# Patient Record
Sex: MALE | Race: WHITE | NOT HISPANIC OR LATINO
[De-identification: names, ages, dates, MRNs, and addresses within clinical notes are randomized per-mention and may not be internally consistent; named-entity substitution may affect disease eponyms.]

---

## 2017-09-20 ENCOUNTER — APPOINTMENT (OUTPATIENT)
Dept: ELECTROPHYSIOLOGY | Facility: CLINIC | Age: 57
End: 2017-09-20
Payer: COMMERCIAL

## 2017-09-20 VITALS
WEIGHT: 274 LBS | OXYGEN SATURATION: 96 % | BODY MASS INDEX: 38.36 KG/M2 | DIASTOLIC BLOOD PRESSURE: 75 MMHG | HEIGHT: 71 IN | HEART RATE: 87 BPM | SYSTOLIC BLOOD PRESSURE: 120 MMHG

## 2017-09-20 PROCEDURE — 99215 OFFICE O/P EST HI 40 MIN: CPT | Mod: 25

## 2017-09-20 PROCEDURE — 93000 ELECTROCARDIOGRAM COMPLETE: CPT

## 2017-09-20 RX ORDER — SELENIUM 50 MCG
TABLET ORAL
Refills: 0 | Status: ACTIVE | COMMUNITY

## 2017-09-20 RX ORDER — UBIDECARENONE 200 MG
CAPSULE ORAL
Refills: 0 | Status: ACTIVE | COMMUNITY

## 2017-09-20 RX ORDER — FLAXSEED
POWDER (GRAM) ORAL
Refills: 0 | Status: ACTIVE | COMMUNITY

## 2017-09-20 RX ORDER — MAGNESIUM OXIDE/MAG AA CHELATE 300 MG
CAPSULE ORAL
Refills: 0 | Status: ACTIVE | COMMUNITY

## 2017-09-20 RX ORDER — B-COMPLEX WITH VITAMIN C
TABLET ORAL
Refills: 0 | Status: ACTIVE | COMMUNITY

## 2017-11-09 ENCOUNTER — APPOINTMENT (OUTPATIENT)
Dept: CV DIAGNOSITCS | Facility: HOSPITAL | Age: 57
End: 2017-11-09

## 2024-09-02 ENCOUNTER — EMERGENCY (EMERGENCY)
Facility: HOSPITAL | Age: 64
LOS: 1 days | Discharge: HOME | End: 2024-09-02
Attending: EMERGENCY MEDICINE | Admitting: STUDENT IN AN ORGANIZED HEALTH CARE EDUCATION/TRAINING PROGRAM
Payer: COMMERCIAL

## 2024-09-02 VITALS
DIASTOLIC BLOOD PRESSURE: 66 MMHG | WEIGHT: 251.11 LBS | RESPIRATION RATE: 18 BRPM | HEART RATE: 99 BPM | SYSTOLIC BLOOD PRESSURE: 125 MMHG | OXYGEN SATURATION: 99 % | TEMPERATURE: 100 F

## 2024-09-02 LAB
ALBUMIN SERPL ELPH-MCNC: 3.7 G/DL — SIGNIFICANT CHANGE UP (ref 3.3–5)
ALP SERPL-CCNC: 118 U/L — SIGNIFICANT CHANGE UP (ref 40–120)
ALT FLD-CCNC: 22 U/L — SIGNIFICANT CHANGE UP (ref 4–41)
ANION GAP SERPL CALC-SCNC: 14 MMOL/L — SIGNIFICANT CHANGE UP (ref 7–14)
AST SERPL-CCNC: 15 U/L — SIGNIFICANT CHANGE UP (ref 4–40)
BASOPHILS # BLD AUTO: 0.04 K/UL — SIGNIFICANT CHANGE UP (ref 0–0.2)
BASOPHILS NFR BLD AUTO: 0.4 % — SIGNIFICANT CHANGE UP (ref 0–2)
BILIRUB SERPL-MCNC: 0.8 MG/DL — SIGNIFICANT CHANGE UP (ref 0.2–1.2)
BUN SERPL-MCNC: 15 MG/DL — SIGNIFICANT CHANGE UP (ref 7–23)
CALCIUM SERPL-MCNC: 9.2 MG/DL — SIGNIFICANT CHANGE UP (ref 8.4–10.5)
CHLORIDE SERPL-SCNC: 99 MMOL/L — SIGNIFICANT CHANGE UP (ref 98–107)
CO2 SERPL-SCNC: 22 MMOL/L — SIGNIFICANT CHANGE UP (ref 22–31)
CREAT SERPL-MCNC: 0.75 MG/DL — SIGNIFICANT CHANGE UP (ref 0.5–1.3)
EGFR: 101 ML/MIN/1.73M2 — SIGNIFICANT CHANGE UP
EOSINOPHIL # BLD AUTO: 0.05 K/UL — SIGNIFICANT CHANGE UP (ref 0–0.5)
EOSINOPHIL NFR BLD AUTO: 0.5 % — SIGNIFICANT CHANGE UP (ref 0–6)
GLUCOSE SERPL-MCNC: 120 MG/DL — HIGH (ref 70–99)
HCT VFR BLD CALC: 46.4 % — SIGNIFICANT CHANGE UP (ref 39–50)
HGB BLD-MCNC: 16.4 G/DL — SIGNIFICANT CHANGE UP (ref 13–17)
IANC: 7.58 K/UL — HIGH (ref 1.8–7.4)
IMM GRANULOCYTES NFR BLD AUTO: 0.2 % — SIGNIFICANT CHANGE UP (ref 0–0.9)
LYMPHOCYTES # BLD AUTO: 18.5 % — SIGNIFICANT CHANGE UP (ref 13–44)
LYMPHOCYTES # BLD AUTO: 2.01 K/UL — SIGNIFICANT CHANGE UP (ref 1–3.3)
MCHC RBC-ENTMCNC: 30.9 PG — SIGNIFICANT CHANGE UP (ref 27–34)
MCHC RBC-ENTMCNC: 35.3 GM/DL — SIGNIFICANT CHANGE UP (ref 32–36)
MCV RBC AUTO: 87.4 FL — SIGNIFICANT CHANGE UP (ref 80–100)
MONOCYTES # BLD AUTO: 1.17 K/UL — HIGH (ref 0–0.9)
MONOCYTES NFR BLD AUTO: 10.8 % — SIGNIFICANT CHANGE UP (ref 2–14)
NEUTROPHILS # BLD AUTO: 7.58 K/UL — HIGH (ref 1.8–7.4)
NEUTROPHILS NFR BLD AUTO: 69.6 % — SIGNIFICANT CHANGE UP (ref 43–77)
NRBC # BLD: 0 /100 WBCS — SIGNIFICANT CHANGE UP (ref 0–0)
NRBC # FLD: 0 K/UL — SIGNIFICANT CHANGE UP (ref 0–0)
PLATELET # BLD AUTO: 147 K/UL — LOW (ref 150–400)
POTASSIUM SERPL-MCNC: 4.1 MMOL/L — SIGNIFICANT CHANGE UP (ref 3.5–5.3)
POTASSIUM SERPL-SCNC: 4.1 MMOL/L — SIGNIFICANT CHANGE UP (ref 3.5–5.3)
PROT SERPL-MCNC: 7.1 G/DL — SIGNIFICANT CHANGE UP (ref 6–8.3)
RBC # BLD: 5.31 M/UL — SIGNIFICANT CHANGE UP (ref 4.2–5.8)
RBC # FLD: 12.9 % — SIGNIFICANT CHANGE UP (ref 10.3–14.5)
SODIUM SERPL-SCNC: 135 MMOL/L — SIGNIFICANT CHANGE UP (ref 135–145)
WBC # BLD: 10.87 K/UL — HIGH (ref 3.8–10.5)
WBC # FLD AUTO: 10.87 K/UL — HIGH (ref 3.8–10.5)

## 2024-09-02 PROCEDURE — 93010 ELECTROCARDIOGRAM REPORT: CPT

## 2024-09-02 RX ORDER — ACETAMINOPHEN 325 MG/1
1000 TABLET ORAL ONCE
Refills: 0 | Status: COMPLETED | OUTPATIENT
Start: 2024-09-02 | End: 2024-09-02

## 2024-09-02 RX ADMIN — Medication 4 MILLIGRAM(S): at 22:29

## 2024-09-02 RX ADMIN — ACETAMINOPHEN 400 MILLIGRAM(S): 325 TABLET ORAL at 22:28

## 2024-09-02 NOTE — ED ADULT NURSE NOTE - CHIEF COMPLAINT QUOTE
Arrives from [facility] for cellulitis of Left side of face. Redness/Slight swelling to left cheek. No distress. As per  EMS was in rapid afib prior to transfer to [facility]. Received Tylenol 650mg and home dose Cardizem prior to leaving hospital, Also received Zosyn, Flagyl, morphine x2 doses , 1L NS infusing. Hx Afib on xarelto, HTN, HLD, T2DM, CAD,  Paper work/ CT CD placed in chart

## 2024-09-02 NOTE — ED PROVIDER NOTE - CLINICAL SUMMARY MEDICAL DECISION MAKING FREE TEXT BOX
NADINE Cooper PGY2- patient transferred from [place] for eval by dental/omfs, 5 days of L upper and now lower dental pain, with 1 day of erythema to face overlying L maxilla; dx with cellulitis at OSH, originating from underlying dental infection per CT scan. Borderline febrile here, will give tylenol as well as morphine for pain, redraw labs, give abx as appropriate. will call dental/omfs to evaluate patient.

## 2024-09-02 NOTE — ED PROVIDER NOTE - PROGRESS NOTE DETAILS
NADINE Cooper PGY2- patient evaluated by OMFS and dental, dental to perform procedure. patient received levaquin and flagyl at outside hospital, plan to put in CDU, continue abx, dental and omfs to re-evaluate in morning

## 2024-09-02 NOTE — ED PROVIDER NOTE - OBJECTIVE STATEMENT
The patient is a 63 y/o M with past medical history of AFIB on xarelto, HTN, HLD, DM, CAD, transferred from [facility] for evaluation by dental/OMFS. Patient with 5 days of worsening dental pain, started in L upper teeth, now also involving left lower teeth. The patient is a 63 y/o M with past medical history of AFIB on xarelto, HTN, HLD, DM, CAD, transferred from [facility] for evaluation by dental/OMFS. Patient with 5 days of dental pain, initially L upper teeth now also involving L lower teeth. In past day noted to have facial erythema overlying L maxilla. Evaluated at [place], febrile there, give tylenol, morphine, as well as doses of levaquin and flagyl for dental infection/cellulitis. CT max/face done there with no abscess, cellulitis appearing to be originating from dental infection. Sent here for eval by dental/omfs.

## 2024-09-02 NOTE — ED PROVIDER NOTE - PHYSICAL EXAMINATION
General: no acute distress  Psych: mood appropriate  Head: normocephalic; erythema and swelling overlying L maxilla and involving L lower eyelid  Eyes: conjunctivae clear bilaterally, sclerae anicteric; eomi, perrl, no pain with extraocular movements  ENT: no nasal flaring, patent nares; significant swelling to L upper gums, very tender, no discrete abscess noted, no drainage from area  Cardio: regular rate and rhythm; normal heart sounds  Resp: clear to auscultation bilaterally  GI: abdomen soft, nontender, nondistended  Neuro: A&Ox3  Skin: erythema to face as noted above  MSK: normal movement of extremities

## 2024-09-02 NOTE — ED ADULT NURSE NOTE - OBJECTIVE STATEMENT
Patient received to room 10, A/Ox4, ambulatory as transfer from [place] for ENT. Patient endorsing left-sided facial swelling and redness starting this AM. Patient found to have cellulitis and transferred. Denies SOB, difficulty swallowing and visual changes. Cheek swollen and red. Arrives with 20G IV to right hand. Placed on cardiac monitor, sinus tach. Safety maintained.

## 2024-09-02 NOTE — ED ADULT TRIAGE NOTE - CHIEF COMPLAINT QUOTE
Arrives from [facility] for cellulitis of Left side of face. Redness/Slight swelling to left cheek.  As pre EMS was in rapid afib prior to transfer to [facility]. Received Tylenol 650mg and home dose Cardizem prior to leaving hospital, Also received Zosyn, Flagyl, morphine x2 doses , 1L NS infusing. Hx Afib on xarelto, HTN, HLD, T2DM, CAD, Arrives from [facility] for cellulitis of Left side of face. Redness/Slight swelling to left cheek. No distress. As per  EMS was in rapid afib prior to transfer to [facility]. Received Tylenol 650mg and home dose Cardizem prior to leaving hospital, Also received Zosyn, Flagyl, morphine x2 doses , 1L NS infusing. Hx Afib on xarelto, HTN, HLD, T2DM, CAD,  Paper work/ CT CD placed in chart

## 2024-09-02 NOTE — ED ADULT TRIAGE NOTE - SOURCE OF INFORMATION
From: Tahira Zimmerman  To: Fito Ram MD  Sent: 1/25/2021 11:01 AM EST  Subject: Prescription Question    May I have an early refill? I have been in a lot more pain with the brain cancer and the cancer that’s spread all through my body. I’m just in a lot of pain.  
Patient

## 2024-09-02 NOTE — ED PROVIDER NOTE - ATTENDING CONTRIBUTION TO CARE
IMPRESSION: Left-sided perimandibular soft tissue swelling tracking to the  left cheek and left inferior periorbital soft tissues compatible with  cellulitis. A suspected odontogenic infection is suspected, however,  metallic dental artifact markedly limits evaluation of the teeth. No  drainable abscess.    Probable left-sided maxillary odontogenic sinusitis Patient is a 64-year-old male with a history of hypertension, hyperlipidemia, atrial fibrillation on Xarelto transferred from [facility] for dental/OMFS secondary to dental infection and left-sided facial cellulitis.  Patient reports that he had pain that started 5-6 days ago on Wednesday.  He reports that this morning he woke up with swelling in his face and developed a fever.  Prior to transfer, patient was in rapid A-fib.  He received his home dose of Cardizem.  Patient received  IV antibiotics prior to transfer.      CT IMPRESSION: Left-sided perimandibular soft tissue swelling tracking to the  left cheek and left inferior periorbital soft tissues compatible with  cellulitis. A suspected odontogenic infection is suspected, however,  metallic dental artifact markedly limits evaluation of the teeth. No  drainable abscess.    Probable left-sided maxillary odontogenic sinusitis     VS noted -  febrile, tachycardic  Gen. no acute distress, Non toxic   HEENT: EOMI, mmm, left facial swelling, TTP  Lungs: CTAB/L no C/ W /R   CVS: RRR   Abd; Soft non tender, non distended   Ext: no edema  Skin: no rash  Neuro AAOx3 non focal clear speech  a/p:  left facial cellulitis–patient is febrile.  Patient was transferred for OMFS/dental.  Plan for labs, pain control, continued antibiotics.  Patient will likely need to be admitted.  Eva Shields MD Patient is a 64-year-old male with a history of hypertension, hyperlipidemia, atrial fibrillation on Xarelto transferred from [facility] for dental/OMFS secondary to dental infection and left-sided facial cellulitis.  Patient reports that he had pain that started 5-6 days ago on Wednesday.  He reports that this morning he woke up with swelling in his face and developed a fever.  Prior to transfer, patient was in rapid A-fib.  He received his home dose of Cardizem.  Patient received  IV antibiotics prior to transfer.      CT IMPRESSION: Left-sided perimandibular soft tissue swelling tracking to the  left cheek and left inferior periorbital soft tissues compatible with  cellulitis. A suspected odontogenic infection is suspected, however,  metallic dental artifact markedly limits evaluation of the teeth. No  drainable abscess.    Probable left-sided maxillary odontogenic sinusitis     VS noted -  febrile, tachycardic  Gen. no acute distress, Non toxic   HEENT: EOMI, mmm, left facial swelling, TTP  Lungs: CTAB/L no C/ W /R   CVS: RRR   Abd; Soft non tender, non distended   Ext: no edema  Skin: no rash  Neuro AAOx3 non focal clear speech  a/p:  left facial cellulitis–patient is febrile.  Patient was transferred for OMFS/dental.  Plan for labs, pain control, continued antibiotics.  Patient will likely need to be admitted vs CDU.  - Alyson ORTA

## 2024-09-03 DIAGNOSIS — I25.10 ATHEROSCLEROTIC HEART DISEASE OF NATIVE CORONARY ARTERY WITHOUT ANGINA PECTORIS: Chronic | ICD-10-CM

## 2024-09-03 LAB
A1C WITH ESTIMATED AVERAGE GLUCOSE RESULT: 7.4 % — HIGH (ref 4–5.6)
ALBUMIN SERPL ELPH-MCNC: 3.6 G/DL — SIGNIFICANT CHANGE UP (ref 3.3–5)
ALP SERPL-CCNC: 101 U/L — SIGNIFICANT CHANGE UP (ref 40–120)
ALT FLD-CCNC: 21 U/L — SIGNIFICANT CHANGE UP (ref 4–41)
ANION GAP SERPL CALC-SCNC: 10 MMOL/L — SIGNIFICANT CHANGE UP (ref 7–14)
AST SERPL-CCNC: 15 U/L — SIGNIFICANT CHANGE UP (ref 4–40)
BASOPHILS # BLD AUTO: 0.03 K/UL — SIGNIFICANT CHANGE UP (ref 0–0.2)
BASOPHILS NFR BLD AUTO: 0.4 % — SIGNIFICANT CHANGE UP (ref 0–2)
BILIRUB SERPL-MCNC: 0.6 MG/DL — SIGNIFICANT CHANGE UP (ref 0.2–1.2)
BUN SERPL-MCNC: 16 MG/DL — SIGNIFICANT CHANGE UP (ref 7–23)
CALCIUM SERPL-MCNC: 9.1 MG/DL — SIGNIFICANT CHANGE UP (ref 8.4–10.5)
CHLORIDE SERPL-SCNC: 102 MMOL/L — SIGNIFICANT CHANGE UP (ref 98–107)
CO2 SERPL-SCNC: 23 MMOL/L — SIGNIFICANT CHANGE UP (ref 22–31)
CREAT SERPL-MCNC: 0.81 MG/DL — SIGNIFICANT CHANGE UP (ref 0.5–1.3)
EGFR: 99 ML/MIN/1.73M2 — SIGNIFICANT CHANGE UP
EOSINOPHIL # BLD AUTO: 0.05 K/UL — SIGNIFICANT CHANGE UP (ref 0–0.5)
EOSINOPHIL NFR BLD AUTO: 0.6 % — SIGNIFICANT CHANGE UP (ref 0–6)
ESTIMATED AVERAGE GLUCOSE: 166 — SIGNIFICANT CHANGE UP
GLUCOSE SERPL-MCNC: 172 MG/DL — HIGH (ref 70–99)
HCT VFR BLD CALC: 46.5 % — SIGNIFICANT CHANGE UP (ref 39–50)
HGB BLD-MCNC: 15.8 G/DL — SIGNIFICANT CHANGE UP (ref 13–17)
IANC: 4.96 K/UL — SIGNIFICANT CHANGE UP (ref 1.8–7.4)
IMM GRANULOCYTES NFR BLD AUTO: 0.2 % — SIGNIFICANT CHANGE UP (ref 0–0.9)
LYMPHOCYTES # BLD AUTO: 2.12 K/UL — SIGNIFICANT CHANGE UP (ref 1–3.3)
LYMPHOCYTES # BLD AUTO: 26.1 % — SIGNIFICANT CHANGE UP (ref 13–44)
MCHC RBC-ENTMCNC: 29.6 PG — SIGNIFICANT CHANGE UP (ref 27–34)
MCHC RBC-ENTMCNC: 34 GM/DL — SIGNIFICANT CHANGE UP (ref 32–36)
MCV RBC AUTO: 87.2 FL — SIGNIFICANT CHANGE UP (ref 80–100)
MONOCYTES # BLD AUTO: 0.94 K/UL — HIGH (ref 0–0.9)
MONOCYTES NFR BLD AUTO: 11.6 % — SIGNIFICANT CHANGE UP (ref 2–14)
NEUTROPHILS # BLD AUTO: 4.96 K/UL — SIGNIFICANT CHANGE UP (ref 1.8–7.4)
NEUTROPHILS NFR BLD AUTO: 61.1 % — SIGNIFICANT CHANGE UP (ref 43–77)
NRBC # BLD: 0 /100 WBCS — SIGNIFICANT CHANGE UP (ref 0–0)
NRBC # FLD: 0 K/UL — SIGNIFICANT CHANGE UP (ref 0–0)
PLATELET # BLD AUTO: 122 K/UL — LOW (ref 150–400)
POTASSIUM SERPL-MCNC: 4.1 MMOL/L — SIGNIFICANT CHANGE UP (ref 3.5–5.3)
POTASSIUM SERPL-SCNC: 4.1 MMOL/L — SIGNIFICANT CHANGE UP (ref 3.5–5.3)
PROT SERPL-MCNC: 6.6 G/DL — SIGNIFICANT CHANGE UP (ref 6–8.3)
RBC # BLD: 5.33 M/UL — SIGNIFICANT CHANGE UP (ref 4.2–5.8)
RBC # FLD: 13.1 % — SIGNIFICANT CHANGE UP (ref 10.3–14.5)
SODIUM SERPL-SCNC: 135 MMOL/L — SIGNIFICANT CHANGE UP (ref 135–145)
WBC # BLD: 8.12 K/UL — SIGNIFICANT CHANGE UP (ref 3.8–10.5)
WBC # FLD AUTO: 8.12 K/UL — SIGNIFICANT CHANGE UP (ref 3.8–10.5)

## 2024-09-03 PROCEDURE — 99223 1ST HOSP IP/OBS HIGH 75: CPT

## 2024-09-03 RX ORDER — METRONIDAZOLE 250 MG
500 TABLET ORAL EVERY 8 HOURS
Refills: 0 | Status: ACTIVE | OUTPATIENT
Start: 2024-09-03 | End: 2025-08-02

## 2024-09-03 RX ORDER — DILTIAZEM HYDROCHLORIDE 5 MG/ML
120 INJECTION INTRAVENOUS DAILY
Refills: 0 | Status: ACTIVE | OUTPATIENT
Start: 2024-09-03 | End: 2025-08-02

## 2024-09-03 RX ORDER — ROSUVASTATIN CALCIUM 10 MG/1
5 TABLET ORAL AT BEDTIME
Refills: 0 | Status: ACTIVE | OUTPATIENT
Start: 2024-09-03 | End: 2025-08-02

## 2024-09-03 RX ORDER — METRONIDAZOLE 250 MG
500 TABLET ORAL ONCE
Refills: 0 | Status: COMPLETED | OUTPATIENT
Start: 2024-09-03 | End: 2024-09-03

## 2024-09-03 RX ORDER — EZETIMIBE 10 MG/1
10 TABLET ORAL DAILY
Refills: 0 | Status: ACTIVE | OUTPATIENT
Start: 2024-09-03 | End: 2025-08-02

## 2024-09-03 RX ORDER — LOSARTAN POTASSIUM 50 MG/1
100 TABLET ORAL DAILY
Refills: 0 | Status: ACTIVE | OUTPATIENT
Start: 2024-09-03 | End: 2025-08-02

## 2024-09-03 RX ORDER — ACETAMINOPHEN 325 MG/1
1000 TABLET ORAL ONCE
Refills: 0 | Status: COMPLETED | OUTPATIENT
Start: 2024-09-03 | End: 2024-09-03

## 2024-09-03 RX ORDER — RIVAROXABAN 10 MG/1
20 TABLET, FILM COATED ORAL
Refills: 0 | Status: ACTIVE | OUTPATIENT
Start: 2024-09-03 | End: 2025-08-02

## 2024-09-03 RX ADMIN — Medication 100 MILLIGRAM(S): at 12:59

## 2024-09-03 RX ADMIN — RIVAROXABAN 20 MILLIGRAM(S): 10 TABLET, FILM COATED ORAL at 19:21

## 2024-09-03 RX ADMIN — ROSUVASTATIN CALCIUM 5 MILLIGRAM(S): 10 TABLET ORAL at 21:29

## 2024-09-03 RX ADMIN — Medication 100 MILLIGRAM(S): at 19:21

## 2024-09-03 RX ADMIN — EZETIMIBE 10 MILLIGRAM(S): 10 TABLET ORAL at 21:30

## 2024-09-03 RX ADMIN — ACETAMINOPHEN 400 MILLIGRAM(S): 325 TABLET ORAL at 15:32

## 2024-09-03 RX ADMIN — DILTIAZEM HYDROCHLORIDE 120 MILLIGRAM(S): 5 INJECTION INTRAVENOUS at 21:29

## 2024-09-03 RX ADMIN — LOSARTAN POTASSIUM 100 MILLIGRAM(S): 50 TABLET ORAL at 17:36

## 2024-09-03 RX ADMIN — Medication 100 MILLIGRAM(S): at 01:55

## 2024-09-03 NOTE — CHART NOTE - NSCHARTNOTEFT_GEN_A_CORE
OMFS procedure: I&D of L canine space abscess   - LA: 5cc of 2%lidocaine w/ epi 1:100K  - incision made in the buccal vestibule apical to #12-13  - Blunt dissection with curved hemostat, no purulence   - irrigation with NS  - Penrose placed and secured with 2-0silk suture     PLAN:  - D/c pt home   - Rec PO Azithromycin  - Rec soft diet   -  FU with OMFS[facility] on Friday Sep 6th, please call #[phone] OMFS procedure: I&D of L canine space abscess   - LA: 5cc of 2%lidocaine w/ epi 1:100K  - incision made in the buccal vestibule apical to #12-13  - Blunt dissection with curved hemostat, no purulence   - irrigation with NS  - Penrose placed and secured with 2-0silk suture     PLAN:  - D/c pt home   - Rec PO Azithromycin  - Rec soft diet   -  FU with OMFS[facility] on Friday Sep 6th, please call #[phone] to schedule an appointment

## 2024-09-03 NOTE — ED CDU PROVIDER INITIAL DAY NOTE - DETAILS
IV Levaquin / IV Flagyl (as advised by Dental), supportive care, additional recommendations as per Dental & OMFS teams following pt, general observation care / monitoring.

## 2024-09-03 NOTE — PROVIDER CONTACT NOTE (OTHER) - ASSESSMENT
64 M with PMH of HTN, HLD, CAD, Type II DM, Afib on Xarelto transferred from [facility] for dental/OMFS s/p dental infection with progressing left-sided facial cellulitis for the past 5 days.

## 2024-09-03 NOTE — ED CDU PROVIDER INITIAL DAY NOTE - PROGRESS NOTE DETAILS
Pt states his left eye feels more swollen since the procedure.  Denies vision changes.  Noted to have erythema and edema at left infraorbital region, perrla, eomi.  Likely d/t tracking dental infection.  Discuss with omfs. received sign out at 7am. pt states the left cheek redness and swelling got worse this morning compared to yesterday and spread periorbitally. EOMS intact. no pain with EOMS. discussed with OMFS and Dr. Lewis, plan to continue IV antibiotics overnight on flagyl and levaquin. OMFS performed 2nd I and D today and placed drain. will continue to monitor. pt amenable with plan.

## 2024-09-03 NOTE — ED ADULT NURSE REASSESSMENT NOTE - NS ED NURSE REASSESS COMMENT FT1
Break Coverage RN: Pt A&Ox4, appears to be resting comfortably. NAD, respirations are even and unlabored. No complaints at this moment. Pt admitted to CDU, awaiting for bed assignment. Medicated as per orders. Safety precautions implemented as per protocol, awaiting further MD orders, plan of care ongoing.

## 2024-09-03 NOTE — ED CDU PROVIDER INITIAL DAY NOTE - CLINICAL SUMMARY MEDICAL DECISION MAKING FREE TEXT BOX
65 yo male, PMH atrial fibrillation, DM, CAD, HTN, HLD, who was transferred to [facility] from [facility] for Dental/OMFS eval for c/o left upper jaw dental pain x 5 days with associated facial swelling.  CT scan performed at [place] (in [facility]) showed: "...IMPRESSION: Left-sided perimandibular soft tissue swelling tracking to the left cheek and left inferior periorbital soft tissues compatible with cellulitis. A suspected odontogenic infection is suspected, however, metallic dental artifact markedly limits evaluation of the teeth. No drainable abscess.  Probable left-sided maxillary odontogenic sinusitis.".  In this ED, Tmax 101.4, VSS.  Dental and OMFS saw pt and performed I&D; Levaquin/Flagyl regimen had been given at [place]; per Dental recommendations, advised to continue Levaquin/Flagyl regimen.  Pt was sent to CDU for plan:  Continue IV Levaquin / IV Flagyl (as advised by Dental), supportive care, additional recommendations as per Dental & OMFS teams following pt, general observation care / monitoring.

## 2024-09-03 NOTE — ED CDU PROVIDER INITIAL DAY NOTE - NSICDXPASTMEDICALHX_GEN_ALL_CORE_FT
PAST MEDICAL HISTORY:  CAD (coronary artery disease)     Chronic atrial fibrillation     DM (diabetes mellitus)     Former smoker     HLD (hyperlipidemia)     HTN (hypertension)

## 2024-09-03 NOTE — ED CDU PROVIDER INITIAL DAY NOTE - ATTENDING APP SHARED VISIT CONTRIBUTION OF CARE
CDU MD GUPTA:  I performed a face to face bedside interview with patient regarding history of present illness, review of symptoms and past medical history. I completed an independent physical exam.  I have discussed patient's plan of care with PA.   I agree with note as stated above, having amended the EMR as needed to reflect my findings. I have discussed the assessment and plan of care.  This includes during the time I functioned as the attending physician for this patient.    65 y/o male h/o cad dm2 htn hld afib on xarelto here with l facial cellulitis x4 days d/t dental infection, s/p i&d by dental, omfs rec continued iv abx.

## 2024-09-03 NOTE — PROVIDER CONTACT NOTE (OTHER) - RECOMMENDATIONS
-rec I&D of canine space infection by GPR  -iv abx (penicillin allergy)  -multimodal pain management  -clear liquid diet  -admit to CDU  -d/c pending AM rounds

## 2024-09-03 NOTE — ED CDU PROVIDER INITIAL DAY NOTE - PHYSICAL EXAMINATION
CONSTITUTIONAL:  Well appearing, awake, alert, oriented to person, place, time/situation and in no apparent distress.  Pt. is objectively comfortable appearing and verbalizing in full, clear, effortless sentences.  ENMT: s/p I&D by Dental team.  +left facial cheek soft tissue swelling / TTP.  Airway patent.  Nasal mucosa clear.  Moist mucous membranes.  Neck supple.  EYES:  Clear OU.  CARDIAC:  Normal rate, regular rhythm.  Heart sounds S1 S2.  No murmurs, gallops, or rubs.  RESPIRATORY:  Breath sounds clear and equal bilaterally.  No wheezes, no rales, no rhonchi.  GASTROINTESTINAL:  Abdomen soft, non-distended, non-tender.  No rebound, no guarding.  NEUROLOGICAL:  Alert and oriented to person/place/time/situation.  No focal deficits; no tremors noted.   MUSCULOSKELETAL:  Range of motion is not limited.    SKIN:  Left cheek soft tissue swelling / TTP.  Skin warm, dry.  PSYCHIATRIC:  Alert and oriented to person/place/time/situation.  Mood and affect WNL.  No apparent risk to self or others.

## 2024-09-03 NOTE — ED CDU PROVIDER INITIAL DAY NOTE - OBJECTIVE STATEMENT
The patient is a 63 y/o M with past medical history of AFIB on xarelto, HTN, HLD, DM, CAD, transferred from [facility] for evaluation by dental/OMFS. Patient with 5 days of worsening dental pain, started in L upper teeth, now also involving left lower teeth.    CDU DEEPTHI Cruz Note---  ED Provider HPI as above, reviewed.  Pt is a 63 yo male, PMH atrial fibrillation, DM, CAD, HTN, HLD, who was transferred to [facility] from [facility] for Dental/OMFS eval for c/o left upper jaw dental pain x 5 days with associated facial swelling.  CT scan performed at [place] (in [employer] system) showed: "...IMPRESSION: Left-sided perimandibular soft tissue swelling tracking to the left cheek and left inferior periorbital soft tissues compatible with cellulitis. A suspected odontogenic infection is suspected, however, metallic dental artifact markedly limits evaluation of the teeth. No drainable abscess.  Probable left-sided maxillary odontogenic sinusitis.".  In this ED, Tmax 101.4, VSS.  Dental and OMFS saw pt and performed I&D; Levaquin/Flagyl regimen had been given at [place]; per Dental recommendations, advised to continue Levaquin/Flagyl regimen.  Pt was sent to CDU for plan:  Continue IV Levaquin / IV Flagyl (as advised by Dental), supportive care, additional recommendations as per Dental & OMFS teams following pt, general observation care / monitoring.

## 2024-09-04 VITALS
DIASTOLIC BLOOD PRESSURE: 84 MMHG | TEMPERATURE: 98 F | OXYGEN SATURATION: 100 % | RESPIRATION RATE: 18 BRPM | HEART RATE: 54 BPM | SYSTOLIC BLOOD PRESSURE: 139 MMHG

## 2024-09-04 PROCEDURE — 99238 HOSP IP/OBS DSCHRG MGMT 30/<: CPT

## 2024-09-04 RX ORDER — IBUPROFEN 600 MG
1 TABLET ORAL
Qty: 21 | Refills: 0
Start: 2024-09-04 | End: 2024-09-10

## 2024-09-04 RX ORDER — KETOROLAC TROMETHAMINE 30 MG/ML
15 INJECTION, SOLUTION INTRAMUSCULAR ONCE
Refills: 0 | Status: DISCONTINUED | OUTPATIENT
Start: 2024-09-04 | End: 2024-09-04

## 2024-09-04 RX ORDER — AZITHROMYCIN 500 MG/1
1 TABLET, FILM COATED ORAL
Qty: 6 | Refills: 0
Start: 2024-09-04 | End: 2024-09-09

## 2024-09-04 RX ORDER — AZITHROMYCIN 500 MG/1
500 TABLET, FILM COATED ORAL ONCE
Refills: 0 | Status: COMPLETED | OUTPATIENT
Start: 2024-09-04 | End: 2024-09-04

## 2024-09-04 RX ORDER — CHLORHEXIDINE GLUCONATE 40 MG/ML
15 SOLUTION TOPICAL
Qty: 210 | Refills: 0
Start: 2024-09-04 | End: 2024-09-10

## 2024-09-04 RX ADMIN — Medication 100 MILLIGRAM(S): at 11:19

## 2024-09-04 RX ADMIN — KETOROLAC TROMETHAMINE 15 MILLIGRAM(S): 30 INJECTION, SOLUTION INTRAMUSCULAR at 08:01

## 2024-09-04 RX ADMIN — AZITHROMYCIN 500 MILLIGRAM(S): 500 TABLET, FILM COATED ORAL at 13:56

## 2024-09-04 RX ADMIN — KETOROLAC TROMETHAMINE 15 MILLIGRAM(S): 30 INJECTION, SOLUTION INTRAMUSCULAR at 09:00

## 2024-09-04 RX ADMIN — LOSARTAN POTASSIUM 100 MILLIGRAM(S): 50 TABLET ORAL at 05:53

## 2024-09-04 RX ADMIN — Medication 100 MILLIGRAM(S): at 03:25

## 2024-09-04 NOTE — ED CDU PROVIDER SUBSEQUENT DAY NOTE - ATTENDING APP SHARED VISIT CONTRIBUTION OF CARE
Kuldeep Law DO:  patient seen and evaluated with the PA.  I was present for key portions of the History & Physical, and I agree with the Impression & Plan. 63 yo m pmh trial fibrillation, DM, CAD, HTN, HLD, who was transferred to [facility] from [facility] for Dental/OMFS eval for c/o left upper jaw dental pain x 5 days with associated facial swelling.  CT scan performed at [place] (in [place] system) showed: "Left-sided perimandibular soft tissue swelling tracking to the left cheek and left inferior periorbital soft tissues compatible with cellulitis." . in cdu for omfs, iv abx. had two I&d. Initially patient did not want a drain placed however after second I&D agreed with the inflation.  Feels well this morning.  Has persistence of mild pain.  Will reevaluate at noon, review OMFS recommendations.

## 2024-09-04 NOTE — ED CDU PROVIDER DISPOSITION NOTE - NSFOLLOWUPINSTRUCTIONS_ED_ALL_ED_FT
Follow up on 9/6/24 with [facility] outpatient clinic, please call [phone] to schedule appointment.  Soft diet. Take Motrin 600mg every 8hrs with food for pain. Take Azithromycin 250mg daily x 6 days.  Swish and spit with Peridex mouth rinse 15ml twice a day. Worsening pain, new facial swelling, fever, chills return to ER

## 2024-09-04 NOTE — PROGRESS NOTE ADULT - ASSESSMENT
64M with PMH of HTN, HLD, CAD, Type II DM, Afib on Xarelto. HD2 s/p I&D of L canine space abscess. progressing well.       Recommendations:   - OMFS will r/a at noon   - Cont IV antibiotics, patient may be discharged on PO Azithromycin for 7 days so long as symptoms have not worsened  - F/U on 9/6/24 with [facility] OMFS outpatient clinic. Patient to call [phone] to schedule.   - Prescribe PO Azithromycin for 7 days and Peridex rinse BID for 14 days  - If any difficulty breathing/swallowing or fever and swelling occur, return to [facility] ED.

## 2024-09-04 NOTE — ED CDU PROVIDER DISPOSITION NOTE - ATTENDING CONTRIBUTION TO CARE
Kuldeep Law DO:  patient seen and evaluated with the PA.  I was present for key portions of the History & Physical, and I agree with the Impression & Plan. 65 yo m pmh trial fibrillation, DM, CAD, HTN, HLD, who was transferred to [facility] from [facility] for Dental/OMFS eval for c/o left upper jaw dental pain x 5 days with associated facial swelling.  CT scan performed at [place] (in [place] system) showed: "Left-sided perimandibular soft tissue swelling tracking to the left cheek and left inferior periorbital soft tissues compatible with cellulitis." . in cdu for omfs, iv abx. had two I&d. Initially patient did not want a drain placed however after second I&D agreed with the placement. omfs recs appreciated. dc w/ strict return precautions.

## 2024-09-04 NOTE — ED CDU PROVIDER DISPOSITION NOTE - CLINICAL COURSE
63 yo male, PMH atrial fibrillation, DM, CAD, HTN, HLD, who was transferred to [facility] from [facility] for Dental/OMFS eval for c/o left upper jaw dental pain x 5 days with associated facial swelling.  CT scan performed at [place] (in [facility]) showed: "Left-sided perimandibular soft tissue swelling tracking to the left cheek and left inferior periorbital soft tissues compatible with cellulitis." In this ED, Tmax 101.4, VSS.  Dental and OMFS saw pt and performed I&D; Levaquin/Flagyl regimen had been given at [place]; per Dental recommendations, advised to continue Levaquin/Flagyl regimen. OMFS performed 2nd I and D yesterday and placed drain. Pt remains afebrile, cleared by OMFS upon re-evaluation this afternoon, requests dc on azithromycin and f/u at OMFS clinic on 9/6 for drain removal.

## 2024-09-04 NOTE — ED CDU PROVIDER SUBSEQUENT DAY NOTE - HISTORY
63 yo male, PMH atrial fibrillation, DM, CAD, HTN, HLD, who was transferred to [facility] from [facility] for Dental/OMFS eval for c/o left upper jaw dental pain x 5 days with associated facial swelling.  CT scan performed at [place] (in [facility]) showed: "Left-sided perimandibular soft tissue swelling tracking to the left cheek and left inferior periorbital soft tissues compatible with cellulitis." In this ED, Tmax 101.4, VSS.  Dental and OMFS saw pt and performed I&D; Levaquin/Flagyl regimen had been given at [place]; per Dental recommendations, advised to continue Levaquin/Flagyl regimen. OMFS performed 2nd I and D yesterday and placed drain. will continue to monitor and IV abx.     Interval hx: Patient seen resting comfortably in NAD. VSS. Tolerating PO. offers no complaints at this time.

## 2024-09-04 NOTE — PROGRESS NOTE ADULT - SUBJECTIVE AND OBJECTIVE BOX
HPI:  64 M with PMH of HTN, HLD, CAD, Type II DM, Afib on Xarelto presented to [facility] ED with left facial swelling extending to infraorbital area.as a transfer patient from [facility]. Patient reports pain began approximately 4 days ago and swelling began early this morning. Tmax 101.4 in ED, AVSS since 0011 today.  [facility] completed incision and drainage of the area under local anesthetic with reported purulent discharge. The patient refused placement of a drain. The patient has been on IV flagyl and Levofloxacin. Patient stayed throughout the morning in the CDU for observation. The patient reports improvement in symptoms since arrival to the [facility] ED and states that he feels his lip is now less swollen.    Vitals:     Vital Signs Last 24 Hrs  T(C): 37 (03 Sep 2024 06:04), Max: 38.6 (02 Sep 2024 22:30)  T(F): 98.6 (03 Sep 2024 06:04), Max: 101.4 (02 Sep 2024 22:30)  HR: 79 (03 Sep 2024 06:04) (79 - 104)  BP: 151/84 (03 Sep 2024 06:04) (112/74 - 151/84)  BP(mean): 88 (03 Sep 2024 00:11) (88 - 88)  RR: 18 (03 Sep 2024 06:04) (16 - 20)  SpO2: 95% (03 Sep 2024 06:04) (95% - 100%)    Parameters below as of 03 Sep 2024 06:04  Patient On (Oxygen Delivery Method): room air    I&O's Detail    Medications:    MEDICATIONS  (STANDING):  levoFLOXacin IVPB 750 milliGRAM(s) IV Intermittent every 24 hours  metroNIDAZOLE  IVPB 500 milliGRAM(s) IV Intermittent every 8 hours    MEDICATIONS  (PRN):  acetaminophen   IVPB .. 1000 milliGRAM(s) IV Intermittent once PRN Mild Pain (1 - 3), Moderate Pain (4 - 6)    Labs:                        15.8   8.12  )-----------( 122      ( 03 Sep 2024 06:01 )             46.5     09-03    135  |  102  |  16  ----------------------------<  172<H>  4.1   |  23  |  0.81    Ca    9.1      03 Sep 2024 06:01    TPro  6.6  /  Alb  3.6  /  TBili  0.6  /  DBili  x   /  AST  15  /  ALT  21  /  AlkPhos  101  09-03    Extraoral examination: Left sided middle facial 1/3rd mild swelling extending superiorly to the eyelid with erythema present. SALLY >35 mm.  Inferior border of the mandible palpable bilaterally.    Intraoral examination: Previous sulcular incision site made from teeth #10-12, indurated swelling of vestibule buccal to teeth #10-12. Incision site hemostatic, small amount of exudate expressed with pressure. No FOM elevation.     Recommendations:   1. Continue IV antibiotics up to 24 hours and then the patient may be discharged on PO Azithromycin for 7 days so long as symptoms have not worsened  2. F/U on 9/6/24 with [facility] outpatient clinic. Patient to call [phone] to schedule.   3. Prescribe PO Azithromycin for 7 days and Peridex rinse BID for 14 days  4. If any difficulty breathing/swallowing or fever and swelling occur, return to [facility] ED.    Jordan Ladd  Oral and Maxillofacial Surgery  [facility] Pager #[phone]  [facility] Pager: [phone]  [facility] Pager: [phone]  Available on Teams
64M with PMH of HTN, HLD, CAD, Type II DM, Afib on Xarelto. HD2 with left facial swelling extending to infraorbital area.  [facility] completed incision and drainage on Monday Sep02of the area under local anesthetic with reported purulent discharge. The patient refused placement of a drain. No significant improvement yesterday, another I&D done yesterday with placement of penrose. The patient reports slight improvement over all.     Interval, NAEON    O/E:  Gen: Aox3, NAD  H&N: L  middle facial 1/3rd mild swelling extending superiorly to the eyelid with erythema present. SALLY >35 mm.  Inferior border of the mandible palpable bilaterally.  IO: Penrose intact, no active purulence discharge, indurated swelling vestibule buccal to teeth #10-12,  No FOM elevation       Vital Signs Last 24 Hrs  T(C): 36.9 (04 Sep 2024 05:50), Max: 37.4 (03 Sep 2024 12:55)  T(F): 98.5 (04 Sep 2024 05:50), Max: 99.4 (03 Sep 2024 12:55)  HR: 89 (04 Sep 2024 05:50) (64 - 89)  BP: 131/84 (04 Sep 2024 05:50) (112/72 - 136/88)  BP(mean): --  RR: 17 (04 Sep 2024 05:50) (17 - 18)  SpO2: 94% (04 Sep 2024 05:50) (94% - 98%)    Parameters below as of 04 Sep 2024 05:50  Patient On (Oxygen Delivery Method): room air    I&O's Detail    MEDICATIONS  (STANDING):  diltiazem    milliGRAM(s) Oral daily  ezetimibe 10 milliGRAM(s) Oral daily  levoFLOXacin IVPB 750 milliGRAM(s) IV Intermittent every 24 hours  losartan 100 milliGRAM(s) Oral daily  metroNIDAZOLE  IVPB 500 milliGRAM(s) IV Intermittent every 8 hours  rivaroxaban 20 milliGRAM(s) Oral with dinner  rosuvastatin 5 milliGRAM(s) Oral at bedtime    MEDICATIONS  (PRN):    Labs:                        15.8   8.12  )-----------( 122      ( 03 Sep 2024 06:01 )             46.5     09-03    135  |  102  |  16  ----------------------------<  172<H>  4.1   |  23  |  0.81    Ca    9.1      03 Sep 2024 06:01    TPro  6.6  /  Alb  3.6  /  TBili  0.6  /  DBili  x   /  AST  15  /  ALT  21  /  AlkPhos  101  09-03
CC: "I have a swelling in my face."     HPI: Patient states that pain began approximately 4 days ago and swelling began early this morning.     Med HX: TRANSFER [facility]    SysAdmin_VisitLink    Current medications (as reported by patient):   Losartan   VIt d and e   Atorvastatin  Rouvasatin   Xarelto   Fish oil pentaphylline     Allergies:   Penicillin         RX:     PSHx: metroNIDAZOLE  IVPB 500 milliGRAM(s) IV Intermittent once      Social Hx:    EOE:   TMJ WNL  Lacerations (-)  Trismus (-)  LAD (-)  Swelling (+) left facial swelling that extends to infraorbital region.   LOC (-)  Dysphagia (-)    IOE:   Hard/Soft palate (WNL)  Tongue/Floor of Mouth (WNL)  Lacerations (-)  Labial Mucosa (WNL)  Buccal Mucosa (WNL)  Percussion (-)  Palpation (-)  Swelling (-)  Mobility (-)     Radiographs:    Assessment:    Treatment:    Bx:    Recommendations:   1. Soft Diet  2. OTC Motrin   3. F/U with outside comprehensive dentist.    Erna Kaur DDS #[id number]
Naresh Victoria   : 1960  MRN: [id number]  Visit ID: [id number]    CC: 64 year old male presents with left facial swelling extending to infraorbital area.   HPI: Patient reports pain began approximately 4 days ago and swelling began early this morning. Patient denies fever.     Med HX: TRANSFER [facility]    SysAdmin_VisitLink      Allergies: penicillin (Unknown)      EOE: (+) swelling on left cheek extending into infraorbital area   TMJ (WNL)  Trismus (-)  Border of the mandible was palpable  LAD (-)  Dysphagia (-)    IOE: Permanent dentition. Patient missing teeth multiple teeth and has multiple dental restorations present.   (+) intraoral swelling associated with teeth #10,11, 12  (+) purulent drainage from gingival sulcus of tooth #11.   Hard/Soft palate WNL  Tongue/Floor of Mouth WNL  Buccal Mucosa WNL  Percussion (-)  Mobility (+) Grade 2 on teeth #10, 11     Radiographs: PAN reveals PARL associated with endodontically treated tooth #11 and tooth #12    Additional Radiograph: CT ordered by ED prior to dental on call arrival.    IMPRESSION: Left-sided perimandibular soft tissue swelling tracking to the  left cheek and left inferior periorbital soft tissues compatible with  cellulitis. A suspected odontogenic infection is suspected, however,  metallic dental artifact markedly limits evaluation of the teeth. No  drainable abscess.    Probable left-sided maxillary odontogenic sinusitis    OMFS consulted. Determined that infraorbital swelling was due to edema and diagnosed a canine space swelling that could be treated with intraoral I&D.     Assessment: Infection of endodontic or periodontal origing associated with tooth #11 and 12.     Treatment: Discussed radiographic and clinical findings with patient. Disccused RBA of recommended treatment. Obtained verbal consent.  Applied 20% benzocaine. Administered 2 carpules 4% septocaine 1:100k epi via local infiltration with changing of needles after each administration. Confirmed profound anesthesia. 15 blade used to make incision from mesial aspect of #12 to mesial aspect of #10 . Hemostat used to blunt dissect until bone was reached. Hemopurulence appreciated. Irrigated profusely with sterile saline. Discussed the need for a drain with patient. Patient rejected idea of drain. No drain placed. POIG verbally. Patient stated that he would like to follow up with his dentist as he lives far but if he cannot get an appointment in a timely manner he would like to be seen at [facility]. Explained that we will call patient this week during normal work hours. Patient contact information: [phone]    Recommendations:   1. Admit overnight to [facility] CDU with IV Flagyl  2. Follow up with patient in the morning and discharge with PO Azithromycin for 7 days  3. F/U in 2-3 business days with either outpatient dentist or [facility] dental clinic [phone].  4. Warm salt water rinses 3-4x/day   5. Avoid smoking, spicy foods or hot foods for 1 week   6. If any difficulty breathing/swallowing or fever and swelling occur, return to ED.      Lanette Hendrix DDS #[id number]

## 2024-09-04 NOTE — ED CDU PROVIDER DISPOSITION NOTE - PATIENT PORTAL LINK FT
You can access the FollowMyHealth Patient Portal offered by [facility] by registering at the following website: [email or web address]. By joining [employer]’s FollowMyHealth portal, you will also be able to view your health information using other applications (apps) compatible with our system.

## 2024-09-04 NOTE — ED CDU PROVIDER SUBSEQUENT DAY NOTE - CLINICAL SUMMARY MEDICAL DECISION MAKING FREE TEXT BOX
65 yo male, PMH atrial fibrillation, DM, CAD, HTN, HLD, who was transferred to [facility] from [facility] for Dental/OMFS eval for c/o left upper jaw dental pain x 5 days with associated facial swelling.  CT scan performed at [place] (in [facility]) showed: "Left-sided perimandibular soft tissue swelling tracking to the left cheek and left inferior periorbital soft tissues compatible with cellulitis." In this ED, Tmax 101.4, VSS.  Dental and OMFS saw pt and performed I&D; Levaquin/Flagyl regimen had been given at [place]; per Dental recommendations, advised to continue Levaquin/Flagyl regimen. OMFS performed 2nd I and D yesterday and placed drain. will continue to monitor and IV abx.

## 2024-09-06 ENCOUNTER — APPOINTMENT (OUTPATIENT)
Age: 64
End: 2024-09-06
Payer: COMMERCIAL

## 2024-09-06 PROCEDURE — 99203 OFFICE O/P NEW LOW 30 MIN: CPT
